# Patient Record
Sex: MALE | Race: WHITE | Employment: OTHER | URBAN - METROPOLITAN AREA
[De-identification: names, ages, dates, MRNs, and addresses within clinical notes are randomized per-mention and may not be internally consistent; named-entity substitution may affect disease eponyms.]

---

## 2022-04-07 ENCOUNTER — HOSPITAL ENCOUNTER (OUTPATIENT)
Dept: PREADMISSION TESTING | Age: 72
Discharge: HOME | End: 2022-04-07
Payer: MEDICARE

## 2022-04-07 VITALS
BODY MASS INDEX: 35.07 KG/M2 | RESPIRATION RATE: 20 BRPM | HEIGHT: 70 IN | WEIGHT: 245 LBS | SYSTOLIC BLOOD PRESSURE: 142 MMHG | TEMPERATURE: 97.9 F | HEART RATE: 71 BPM | DIASTOLIC BLOOD PRESSURE: 88 MMHG

## 2022-04-07 LAB
ANION GAP SERPL CALC-SCNC: 7 MMOL/L (ref 5–15)
APTT PPP: 37.3 SEC (ref 22.1–31)
BASOPHILS # BLD: 0.1 K/UL (ref 0–0.1)
BASOPHILS NFR BLD: 1 % (ref 0–1)
BUN SERPL-MCNC: 8 MG/DL (ref 6–20)
BUN/CREAT SERPL: 13 (ref 12–20)
CALCIUM SERPL-MCNC: 8.7 MG/DL (ref 8.5–10.1)
CHLORIDE SERPL-SCNC: 104 MMOL/L (ref 97–108)
CO2 SERPL-SCNC: 28 MMOL/L (ref 21–32)
CREAT SERPL-MCNC: 0.61 MG/DL (ref 0.7–1.3)
DIFFERENTIAL METHOD BLD: NORMAL
EOSINOPHIL # BLD: 0.1 K/UL (ref 0–0.4)
EOSINOPHIL NFR BLD: 1 % (ref 0–7)
ERYTHROCYTE [DISTWIDTH] IN BLOOD BY AUTOMATED COUNT: 13.6 % (ref 11.5–14.5)
EST. AVERAGE GLUCOSE BLD GHB EST-MCNC: 255 MG/DL
GLUCOSE SERPL-MCNC: 206 MG/DL (ref 65–100)
HBA1C MFR BLD: 10.5 % (ref 4–5.6)
HCT VFR BLD AUTO: 44 % (ref 36.6–50.3)
HGB BLD-MCNC: 14.4 G/DL (ref 12.1–17)
IMM GRANULOCYTES # BLD AUTO: 0 K/UL (ref 0–0.04)
IMM GRANULOCYTES NFR BLD AUTO: 0 % (ref 0–0.5)
INR PPP: 3.6 (ref 0.9–1.1)
LYMPHOCYTES # BLD: 2.4 K/UL (ref 0.8–3.5)
LYMPHOCYTES NFR BLD: 32 % (ref 12–49)
MCH RBC QN AUTO: 29.1 PG (ref 26–34)
MCHC RBC AUTO-ENTMCNC: 32.7 G/DL (ref 30–36.5)
MCV RBC AUTO: 89.1 FL (ref 80–99)
MONOCYTES # BLD: 0.7 K/UL (ref 0–1)
MONOCYTES NFR BLD: 9 % (ref 5–13)
NEUTS SEG # BLD: 4.2 K/UL (ref 1.8–8)
NEUTS SEG NFR BLD: 57 % (ref 32–75)
NRBC # BLD: 0 K/UL (ref 0–0.01)
NRBC BLD-RTO: 0 PER 100 WBC
PLATELET # BLD AUTO: 219 K/UL (ref 150–400)
PMV BLD AUTO: 10.8 FL (ref 8.9–12.9)
POTASSIUM SERPL-SCNC: 3.4 MMOL/L (ref 3.5–5.1)
PROTHROMBIN TIME: 34.6 SEC (ref 9–11.1)
RBC # BLD AUTO: 4.94 M/UL (ref 4.1–5.7)
SODIUM SERPL-SCNC: 139 MMOL/L (ref 136–145)
THERAPEUTIC RANGE,PTTT: ABNORMAL SECS (ref 58–77)
WBC # BLD AUTO: 7.4 K/UL (ref 4.1–11.1)

## 2022-04-07 PROCEDURE — 83036 HEMOGLOBIN GLYCOSYLATED A1C: CPT

## 2022-04-07 PROCEDURE — 36415 COLL VENOUS BLD VENIPUNCTURE: CPT

## 2022-04-07 PROCEDURE — 85730 THROMBOPLASTIN TIME PARTIAL: CPT

## 2022-04-07 PROCEDURE — 85610 PROTHROMBIN TIME: CPT

## 2022-04-07 PROCEDURE — 85025 COMPLETE CBC W/AUTO DIFF WBC: CPT

## 2022-04-07 PROCEDURE — 80048 BASIC METABOLIC PNL TOTAL CA: CPT

## 2022-04-07 RX ORDER — ESCITALOPRAM OXALATE 20 MG/1
20 TABLET ORAL
COMMUNITY

## 2022-04-07 RX ORDER — GABAPENTIN 300 MG/1
300 CAPSULE ORAL
COMMUNITY

## 2022-04-07 RX ORDER — INSULIN GLARGINE 300 U/ML
30 INJECTION, SOLUTION SUBCUTANEOUS DAILY
COMMUNITY

## 2022-04-07 RX ORDER — LISINOPRIL 40 MG/1
40 TABLET ORAL
COMMUNITY

## 2022-04-07 RX ORDER — METFORMIN HYDROCHLORIDE 1000 MG/1
1000 TABLET ORAL 2 TIMES DAILY
COMMUNITY

## 2022-04-07 RX ORDER — CARBIDOPA 25 MG/1
25 TABLET ORAL
COMMUNITY

## 2022-04-07 RX ORDER — WARFARIN 6 MG/1
6 TABLET ORAL DAILY
COMMUNITY

## 2022-04-07 NOTE — PERIOP NOTES
[address] INSTRUCTIONS    Surgery Date:   4-14-22    [facility] staff will call you between 4 PM- 8 PM the day before surgery with your arrival time. If your surgery is on a Monday, we will call you the preceding Friday. Please call [phone] after 8 PM if you did not receive your arrival time. 1. Please report to [facility] Patient Access/Admitting on the 1st floor. Bring your insurance card, photo identification, and any copayment ( if applicable). 2. If you are going home the same day of your surgery, you must have a responsible adult to drive you home. You need to have a responsible adult to stay with you the first 24 hours after surgery and you should not drive a car for 24 hours following your surgery. 3. Nothing to eat or drink after midnight the night before surgery. This includes no water, gum, mints, coffee, juice, etc.  Please note special instructions, if applicable, below for medications. 4. Do NOT drink alcohol or smoke 24 hours before surgery. STOP smoking for 14 days prior as it helps with breathing and healing after surgery. 5. If you are being admitted to the hospital, please leave personal belongings/luggage in your car until you have an assigned hospital room number. 6. Please wear comfortable clothes. Wear your glasses instead of contacts. We ask that all money, jewelry and valuables be left at home. Wear no make up, particularly mascara, the day of surgery. 7.  All body piercings, rings, and jewelry need to be removed and left at home. Please remove any nail polish or artificial nails from your fingernails. Please wear your hair loose or down. Please no pony-tails, buns, or any metal hair accessories. If you shower the morning of surgery, please do not apply any lotions or powders afterwards. You may wear deodorant, unless having breast surgery. Do not shave any body area within 24 hours of your surgery.   8. Please follow all instructions to avoid any potential surgical cancellation. 9. Should your physical condition change, (i.e. fever, cold, flu, etc.) please notify your surgeon as soon as possible. 10. It is important to be on time. If a situation occurs where you may be delayed, please call:  [phone] / [phone] on the day of surgery. 11. The Preadmission Testing staff can be reached at [phone]. 12. Special instructions: CALL SURGEON OR PCP FOR INSTRUCTIONS ON  COUMADIN      Current Outpatient Medications   Medication Sig    metFORMIN (GLUCOPHAGE) 1,000 mg tablet Take 1,000 mg by mouth two (2) times a day.  warfarin (Coumadin) 6 mg tablet Take 6 mg by mouth daily.  gabapentin (NEURONTIN) 300 mg capsule Take 300 mg by mouth nightly.  lisinopriL (PRINIVIL, ZESTRIL) 40 mg tablet Take 40 mg by mouth nightly.  carbidopa (Lodosyn) 25 mg tab tablet Take 25 mg by mouth nightly.  escitalopram oxalate (LEXAPRO) 20 mg tablet Take 20 mg by mouth nightly.  insulin glargine U-300 conc (Toujeo Max U-300 SoloStar) 300 unit/mL (3 mL) inpn 30 Units by SubCUTAneous route.  acetaminophen/diphenhydramine (TYLENOL PM PO) Take  by mouth nightly as needed.  atorvastatin (LIPITOR) 80 mg tablet Take 80 mg by mouth nightly.  atenolol (TENORMIN) 25 mg tablet Take 25 mg by mouth nightly. No current facility-administered medications for this encounter. 1. YOU MUST ONLY TAKE THESE MEDICATIONS THE MORNING OF SURGERY WITH A SIP OF WATER: NONE  2. MEDICATIONS TO TAKE THE MORNING OF SURGERY ONLY IF NEEDED: NONE  3. HOLD these prescription medications BEFORE Surgery: METFORMIN 24 HOURS PRIOR TO SURGERY UNLESS OTHERWISE INSTRUCTED BY MD  4. Ask your surgeon/prescribing physician about when/if to STOP taking these medications: COUMADIN  5. Stop all vitamins, herbal medicines and Aspirin containing products 7 days prior to surgery.  Stop any non-steroidal anti-inflammatory drugs (i.e. Ibuprofen, Naproxen, Advil, Aleve) 3 days before surgery. You may take Tylenol. 6. If you are currently taking Plavix, Coumadin, or any other blood-thinning/anticoagulant medication contact your prescribing physician for instructions. Preventing Infections Before and After  Your Surgery    IMPORTANT INSTRUCTIONS      You play an important role in your health and preparation for surgery. To reduce the germs on your skin you will need to shower with CHG soap (Chorhexidine gluconate 4%) two times before surgery. CHG soap (Hibiclens, Hex-A-Clens or store brand)   CHG soap will be provided at your Preadmission Testing (PAT) appointment.  If you do not have a PAT appointment before surgery, you may arrange to  CHG soap from our office or purchase CHG soap at a pharmacy, grocery or department store.  You need to purchase TWO 4 ounce bottles to use for your 2 showers. Steps to follow:  1. Wash your hair with your normal shampoo and your body with regular soap and rinse well to remove shampoo and soap from your skin. 2. Wet a clean washcloth and turn off the shower. 3. Put CHG soap on washcloth and apply to your entire body from the neck down. Do not use on your head, face or private parts(genitals). Do not use CHG soap on open sores, wounds or areas of skin irritation. 4. Wash you body gently for 5 minutes. Do not wash your skin too hard. This soap does not create lather. Pay special attention to your underarms and from your belly button to your feet. 5. Turn the shower back on and rinse well to get CHG soap off your body. 6. Pat your skin dry with a clean, dry towel. Do not apply lotions or moisturizer. 7. Put on clean clothes and sleep on fresh bed sheets and do not allow pets to sleep with you. Shower with CHG soap 2 times before your surgery   The evening before your surgery   The morning of your surgery      Tips to help prevent infections after your surgery:  1.  Protect your surgical wound from germs:  ? Hand washing is the most important thing you and your caregivers can do to prevent infections. ? Keep your bandage clean and dry! ? Do not touch your surgical wound. 2. Use clean, freshly washed towels and washcloths every time you shower; do not share bath linens with others. 3. Until your surgical wound is healed, wear clothing and sleep on bed linens each day that are clean and freshly washed. 4. Do not allow pets to sleep in your bed with you or touch your surgical wound. 5. Do not smoke  smoking delays wound healing. This may be a good time to stop smoking. 6. If you have diabetes, it is important for you to manage your blood sugar levels properly before your surgery as well as after your surgery. Poorly managed blood sugar levels slow down wound healing and prevent you from healing completely. Patient Information Regarding COVID Restrictions    Day of Procedure     Please park in the parking deck or any designated visitor parking lot.  Enter the facility through the [facility] the [facility].   On the day of surgery, please provide the cell phone number of the person who will be waiting for you to the Patient Access representative at the time of registration.  Please wear a mask on the day of your procedure.  We are now allowing two designated visitors per stay. Pediatric patients may have 2 designated visitors. These two people may come in with you on the day of your procedure.  No visitors under the age of 13.  The designated visitor must also wear a mask.  Once your procedure and the immediate recovery period is completed, a nurse in the recovery area will contact your designated visitor to inform them of your room number or to otherwise review other pertinent information regarding your care.  Social distancing practices are to be adhered to in waiting areas and the cafeteria. The patient was contacted  in person.    He verbalized understanding of all instructions does not  need reinforcement.

## 2022-04-07 NOTE — PERIOP NOTES
Prevention of Infection  Testing for Staphylococcus aureus on your skin before surgery    Staphylococcus aureus (staph) is a common bacteria that is found on the body. It normally does not cause infection on healthy skin. Before surgery, you will be tested to see if you have staph by swabbing the inside of your nose. When you have an incision with surgery, the goal is to protect that incision from infection. Removal of the staph bacteria before surgery can decrease the risk of a surgical site infection. If your nose swab is positive for staph you will be called. Your treatment will include 2 steps:   Prescription for Mupirocin ointment to be used in each nostril twice a day for 5 days.  Showering with Chlorhexidine (CHG) liquid soap for 5 days prior to surgery. How to use Mupirocin ointment in your nose  1.  the prescription from your pharmacy. You will receive a large tube of ointment which will be big enough for all of your treatments. You will apply this ointment to each nostril 2 times a day for 5 days. 2. Wash your hands with  gel or soap and water for 20 seconds before using ointment. 3. Place a pea-sized amount of ointment on a cotton Q-tip. 4. Apply ointment just inside of each nostril with the Q-tip. Do not push Q-tip or ointment deep inside you nose. 5. Press your nostrils together and massage for a few seconds. 6. Wash your hands with  gel or soap and water after you are finished. 7. Do not get ointment near your eyes. If it gets into your eyes, rinse them with cool water. 8. If you need to use nasal spray, clean the tip of the bottle with alcohol before use and do not use both at the same time. 9. If you are scheduled for COVID testing during the 5 days, do NOT apply morning dose until after the COVID test has been performed. How to use Chlorhexidine (CHG) 4% liquid soap  1.  Purchase an 8 ounce bottle of CHG liquid soap (Chlorhexidine 4%, Hibiclens, Lora-Jens or store brand) at a pharmacy or grocery store. 2. Wash your hair with your normal shampoo and your body with regular soap and rinse well to remove shampoo and soap from your skin. 3. Wet a clean washcloth and turn off the shower. 4. Put CHG soap on washcloth and apply to your entire body from the neck down. Do not use on your head, face or private parts(genitals). Do not use CHG soap on open sores, wounds or areas of skin irritation. 5. Wash your body gently for 5 minutes. Do not wash your skin too hard. This soap does not create lather. Pay special attention to your underarms and from your belly button to your feet. 6. Turn the shower back on and rinse well to get CHG soap off your body. 7. Pat your skin dry with a clean, dry towel. Do not apply lotions or moisturizer. 8. Put on clean clothes and sleep on fresh bed sheets the night before surgery. Do not allow pets to sleep with you.

## 2022-04-08 LAB
BACTERIA SPEC CULT: NORMAL
BACTERIA SPEC CULT: NORMAL
SERVICE CMNT-IMP: NORMAL

## 2022-04-08 NOTE — PERIOP NOTES
[facility] [address] office re:  Glucose 206, HgbA1c 10.5, PT 34.6, INR 3.6, PTT 37.3. Chart referred to CITLALLI Cancino NP for f/u.

## 2022-04-14 ENCOUNTER — HOSPITAL ENCOUNTER (OUTPATIENT)
Age: 72
Setting detail: OUTPATIENT SURGERY
Discharge: HOME | End: 2022-04-14
Attending: OTOLARYNGOLOGY | Admitting: OTOLARYNGOLOGY
Payer: MEDICARE

## 2022-04-14 ENCOUNTER — ANESTHESIA (OUTPATIENT)
Dept: SURGERY | Age: 72
End: 2022-04-14
Payer: MEDICARE

## 2022-04-14 ENCOUNTER — ANESTHESIA EVENT (OUTPATIENT)
Dept: SURGERY | Age: 72
End: 2022-04-14
Payer: MEDICARE

## 2022-04-14 VITALS
RESPIRATION RATE: 16 BRPM | WEIGHT: 244.93 LBS | BODY MASS INDEX: 35.07 KG/M2 | DIASTOLIC BLOOD PRESSURE: 79 MMHG | SYSTOLIC BLOOD PRESSURE: 128 MMHG | HEIGHT: 70 IN | HEART RATE: 54 BPM | OXYGEN SATURATION: 100 % | TEMPERATURE: 98.6 F

## 2022-04-14 LAB
GLUCOSE BLD STRIP.AUTO-MCNC: 185 MG/DL (ref 65–117)
GLUCOSE BLD STRIP.AUTO-MCNC: 208 MG/DL (ref 65–117)
INR BLD: 1.1 (ref 0.9–1.2)
SERVICE CMNT-IMP: ABNORMAL
SERVICE CMNT-IMP: ABNORMAL

## 2022-04-14 PROCEDURE — 76210000020 HC REC RM PH II FIRST 0.5 HR: Performed by: OTOLARYNGOLOGY

## 2022-04-14 PROCEDURE — 82962 GLUCOSE BLOOD TEST: CPT

## 2022-04-14 PROCEDURE — 77030008684 HC TU ET CUF COVD -B: Performed by: ANESTHESIOLOGY

## 2022-04-14 PROCEDURE — 76210000006 HC OR PH I REC 0.5 TO 1 HR: Performed by: OTOLARYNGOLOGY

## 2022-04-14 PROCEDURE — 74011250636 HC RX REV CODE- 250/636: Performed by: ANESTHESIOLOGY

## 2022-04-14 PROCEDURE — 76060000033 HC ANESTHESIA 1 TO 1.5 HR: Performed by: OTOLARYNGOLOGY

## 2022-04-14 PROCEDURE — 74011000250 HC RX REV CODE- 250: Performed by: OTOLARYNGOLOGY

## 2022-04-14 PROCEDURE — 85610 PROTHROMBIN TIME: CPT

## 2022-04-14 PROCEDURE — 76010000138 HC OR TIME 0.5 TO 1 HR: Performed by: OTOLARYNGOLOGY

## 2022-04-14 PROCEDURE — 77030026438 HC STYL ET INTUB CARD -A: Performed by: ANESTHESIOLOGY

## 2022-04-14 PROCEDURE — 88304 TISSUE EXAM BY PATHOLOGIST: CPT

## 2022-04-14 PROCEDURE — 74011250637 HC RX REV CODE- 250/637: Performed by: ANESTHESIOLOGY

## 2022-04-14 PROCEDURE — 74011000250 HC RX REV CODE- 250: Performed by: NURSE ANESTHETIST, CERTIFIED REGISTERED

## 2022-04-14 PROCEDURE — 74011250636 HC RX REV CODE- 250/636: Performed by: NURSE ANESTHETIST, CERTIFIED REGISTERED

## 2022-04-14 PROCEDURE — 2709999900 HC NON-CHARGEABLE SUPPLY: Performed by: OTOLARYNGOLOGY

## 2022-04-14 PROCEDURE — 77030040361 HC SLV COMPR DVT MDII -B: Performed by: OTOLARYNGOLOGY

## 2022-04-14 RX ORDER — MORPHINE SULFATE 2 MG/ML
2 INJECTION, SOLUTION INTRAMUSCULAR; INTRAVENOUS
Status: DISCONTINUED | OUTPATIENT
Start: 2022-04-14 | End: 2022-04-14 | Stop reason: HOSPADM

## 2022-04-14 RX ORDER — ONDANSETRON 2 MG/ML
4 INJECTION INTRAMUSCULAR; INTRAVENOUS AS NEEDED
Status: DISCONTINUED | OUTPATIENT
Start: 2022-04-14 | End: 2022-04-14 | Stop reason: HOSPADM

## 2022-04-14 RX ORDER — CEFAZOLIN SODIUM 1 G/3ML
INJECTION, POWDER, FOR SOLUTION INTRAMUSCULAR; INTRAVENOUS AS NEEDED
Status: DISCONTINUED | OUTPATIENT
Start: 2022-04-14 | End: 2022-04-14 | Stop reason: HOSPADM

## 2022-04-14 RX ORDER — ONDANSETRON 2 MG/ML
4 INJECTION INTRAMUSCULAR; INTRAVENOUS
Status: CANCELLED | OUTPATIENT
Start: 2022-04-14

## 2022-04-14 RX ORDER — FENTANYL CITRATE 50 UG/ML
INJECTION, SOLUTION INTRAMUSCULAR; INTRAVENOUS AS NEEDED
Status: DISCONTINUED | OUTPATIENT
Start: 2022-04-14 | End: 2022-04-14 | Stop reason: HOSPADM

## 2022-04-14 RX ORDER — OXYCODONE AND ACETAMINOPHEN 5; 325 MG/1; MG/1
1 TABLET ORAL AS NEEDED
Status: DISCONTINUED | OUTPATIENT
Start: 2022-04-14 | End: 2022-04-14 | Stop reason: HOSPADM

## 2022-04-14 RX ORDER — SODIUM CHLORIDE, SODIUM LACTATE, POTASSIUM CHLORIDE, CALCIUM CHLORIDE 600; 310; 30; 20 MG/100ML; MG/100ML; MG/100ML; MG/100ML
125 INJECTION, SOLUTION INTRAVENOUS CONTINUOUS
Status: DISCONTINUED | OUTPATIENT
Start: 2022-04-14 | End: 2022-04-14 | Stop reason: HOSPADM

## 2022-04-14 RX ORDER — FENTANYL CITRATE 50 UG/ML
25 INJECTION, SOLUTION INTRAMUSCULAR; INTRAVENOUS
Status: DISCONTINUED | OUTPATIENT
Start: 2022-04-14 | End: 2022-04-14 | Stop reason: HOSPADM

## 2022-04-14 RX ORDER — SODIUM CHLORIDE 0.9 % (FLUSH) 0.9 %
5-40 SYRINGE (ML) INJECTION AS NEEDED
Status: DISCONTINUED | OUTPATIENT
Start: 2022-04-14 | End: 2022-04-14 | Stop reason: HOSPADM

## 2022-04-14 RX ORDER — SODIUM CHLORIDE 0.9 % (FLUSH) 0.9 %
5-40 SYRINGE (ML) INJECTION AS NEEDED
Status: CANCELLED | OUTPATIENT
Start: 2022-04-14

## 2022-04-14 RX ORDER — BACITRACIN ZINC 500 UNIT/G
OINTMENT (GRAM) TOPICAL AS NEEDED
Status: DISCONTINUED | OUTPATIENT
Start: 2022-04-14 | End: 2022-04-14 | Stop reason: HOSPADM

## 2022-04-14 RX ORDER — HYDROCODONE BITARTRATE AND ACETAMINOPHEN 5; 325 MG/1; MG/1
1 TABLET ORAL
Status: CANCELLED | OUTPATIENT
Start: 2022-04-14

## 2022-04-14 RX ORDER — ROCURONIUM BROMIDE 10 MG/ML
INJECTION, SOLUTION INTRAVENOUS AS NEEDED
Status: DISCONTINUED | OUTPATIENT
Start: 2022-04-14 | End: 2022-04-14 | Stop reason: HOSPADM

## 2022-04-14 RX ORDER — LIDOCAINE HYDROCHLORIDE 10 MG/ML
0.1 INJECTION, SOLUTION EPIDURAL; INFILTRATION; INTRACAUDAL; PERINEURAL AS NEEDED
Status: DISCONTINUED | OUTPATIENT
Start: 2022-04-14 | End: 2022-04-14 | Stop reason: HOSPADM

## 2022-04-14 RX ORDER — ACETAMINOPHEN 325 MG/1
650 TABLET ORAL ONCE
Status: COMPLETED | OUTPATIENT
Start: 2022-04-14 | End: 2022-04-14

## 2022-04-14 RX ORDER — MIDAZOLAM HYDROCHLORIDE 1 MG/ML
INJECTION, SOLUTION INTRAMUSCULAR; INTRAVENOUS AS NEEDED
Status: DISCONTINUED | OUTPATIENT
Start: 2022-04-14 | End: 2022-04-14 | Stop reason: HOSPADM

## 2022-04-14 RX ORDER — LIDOCAINE HYDROCHLORIDE 20 MG/ML
INJECTION, SOLUTION EPIDURAL; INFILTRATION; INTRACAUDAL; PERINEURAL AS NEEDED
Status: DISCONTINUED | OUTPATIENT
Start: 2022-04-14 | End: 2022-04-14 | Stop reason: HOSPADM

## 2022-04-14 RX ORDER — SUCCINYLCHOLINE CHLORIDE 20 MG/ML
INJECTION INTRAMUSCULAR; INTRAVENOUS AS NEEDED
Status: DISCONTINUED | OUTPATIENT
Start: 2022-04-14 | End: 2022-04-14 | Stop reason: HOSPADM

## 2022-04-14 RX ORDER — DEXMEDETOMIDINE HYDROCHLORIDE 100 UG/ML
INJECTION, SOLUTION INTRAVENOUS AS NEEDED
Status: DISCONTINUED | OUTPATIENT
Start: 2022-04-14 | End: 2022-04-14 | Stop reason: HOSPADM

## 2022-04-14 RX ORDER — HYDROMORPHONE HYDROCHLORIDE 1 MG/ML
0.2 INJECTION, SOLUTION INTRAMUSCULAR; INTRAVENOUS; SUBCUTANEOUS
Status: DISCONTINUED | OUTPATIENT
Start: 2022-04-14 | End: 2022-04-14 | Stop reason: HOSPADM

## 2022-04-14 RX ORDER — FENTANYL CITRATE 50 UG/ML
50 INJECTION, SOLUTION INTRAMUSCULAR; INTRAVENOUS AS NEEDED
Status: DISCONTINUED | OUTPATIENT
Start: 2022-04-14 | End: 2022-04-14 | Stop reason: HOSPADM

## 2022-04-14 RX ORDER — ONDANSETRON 2 MG/ML
INJECTION INTRAMUSCULAR; INTRAVENOUS AS NEEDED
Status: DISCONTINUED | OUTPATIENT
Start: 2022-04-14 | End: 2022-04-14 | Stop reason: HOSPADM

## 2022-04-14 RX ORDER — PROPOFOL 10 MG/ML
INJECTION, EMULSION INTRAVENOUS AS NEEDED
Status: DISCONTINUED | OUTPATIENT
Start: 2022-04-14 | End: 2022-04-14 | Stop reason: HOSPADM

## 2022-04-14 RX ORDER — SODIUM CHLORIDE 0.9 % (FLUSH) 0.9 %
5-40 SYRINGE (ML) INJECTION EVERY 8 HOURS
Status: DISCONTINUED | OUTPATIENT
Start: 2022-04-14 | End: 2022-04-14 | Stop reason: HOSPADM

## 2022-04-14 RX ORDER — MIDAZOLAM HYDROCHLORIDE 1 MG/ML
1 INJECTION, SOLUTION INTRAMUSCULAR; INTRAVENOUS AS NEEDED
Status: DISCONTINUED | OUTPATIENT
Start: 2022-04-14 | End: 2022-04-14 | Stop reason: HOSPADM

## 2022-04-14 RX ORDER — MIDAZOLAM HYDROCHLORIDE 1 MG/ML
0.5 INJECTION, SOLUTION INTRAMUSCULAR; INTRAVENOUS
Status: DISCONTINUED | OUTPATIENT
Start: 2022-04-14 | End: 2022-04-14 | Stop reason: HOSPADM

## 2022-04-14 RX ORDER — SODIUM CHLORIDE 0.9 % (FLUSH) 0.9 %
5-40 SYRINGE (ML) INJECTION EVERY 8 HOURS
Status: CANCELLED | OUTPATIENT
Start: 2022-04-14

## 2022-04-14 RX ORDER — DIPHENHYDRAMINE HYDROCHLORIDE 50 MG/ML
12.5 INJECTION, SOLUTION INTRAMUSCULAR; INTRAVENOUS AS NEEDED
Status: DISCONTINUED | OUTPATIENT
Start: 2022-04-14 | End: 2022-04-14 | Stop reason: HOSPADM

## 2022-04-14 RX ORDER — PHENYLEPHRINE HCL IN 0.9% NACL 0.4MG/10ML
SYRINGE (ML) INTRAVENOUS AS NEEDED
Status: DISCONTINUED | OUTPATIENT
Start: 2022-04-14 | End: 2022-04-14 | Stop reason: HOSPADM

## 2022-04-14 RX ORDER — LIDOCAINE HYDROCHLORIDE AND EPINEPHRINE 10; 10 MG/ML; UG/ML
INJECTION, SOLUTION INFILTRATION; PERINEURAL AS NEEDED
Status: DISCONTINUED | OUTPATIENT
Start: 2022-04-14 | End: 2022-04-14 | Stop reason: HOSPADM

## 2022-04-14 RX ORDER — MORPHINE SULFATE 2 MG/ML
2 INJECTION, SOLUTION INTRAMUSCULAR; INTRAVENOUS
Status: CANCELLED | OUTPATIENT
Start: 2022-04-14

## 2022-04-14 RX ORDER — SODIUM CHLORIDE 9 MG/ML
25 INJECTION, SOLUTION INTRAVENOUS CONTINUOUS
Status: DISCONTINUED | OUTPATIENT
Start: 2022-04-14 | End: 2022-04-14 | Stop reason: HOSPADM

## 2022-04-14 RX ORDER — DEXAMETHASONE SODIUM PHOSPHATE 4 MG/ML
INJECTION, SOLUTION INTRA-ARTICULAR; INTRALESIONAL; INTRAMUSCULAR; INTRAVENOUS; SOFT TISSUE AS NEEDED
Status: DISCONTINUED | OUTPATIENT
Start: 2022-04-14 | End: 2022-04-14 | Stop reason: HOSPADM

## 2022-04-14 RX ADMIN — ACETAMINOPHEN 650 MG: 325 TABLET ORAL at 14:02

## 2022-04-14 RX ADMIN — PROPOFOL 50 MG: 10 INJECTION, EMULSION INTRAVENOUS at 15:37

## 2022-04-14 RX ADMIN — SUCCINYLCHOLINE CHLORIDE 140 MG: 20 INJECTION, SOLUTION INTRAMUSCULAR; INTRAVENOUS at 15:37

## 2022-04-14 RX ADMIN — ROCURONIUM BROMIDE 10 MG: 10 SOLUTION INTRAVENOUS at 15:36

## 2022-04-14 RX ADMIN — Medication 80 MCG: at 16:10

## 2022-04-14 RX ADMIN — FENTANYL CITRATE 50 MCG: 50 INJECTION, SOLUTION INTRAMUSCULAR; INTRAVENOUS at 15:41

## 2022-04-14 RX ADMIN — LIDOCAINE HYDROCHLORIDE 80 MG: 20 INJECTION, SOLUTION EPIDURAL; INFILTRATION; INTRACAUDAL; PERINEURAL at 15:36

## 2022-04-14 RX ADMIN — ONDANSETRON HYDROCHLORIDE 4 MG: 2 INJECTION, SOLUTION INTRAMUSCULAR; INTRAVENOUS at 15:42

## 2022-04-14 RX ADMIN — PROPOFOL 100 MG: 10 INJECTION, EMULSION INTRAVENOUS at 15:36

## 2022-04-14 RX ADMIN — DEXMEDETOMIDINE HYDROCHLORIDE 10 MCG: 100 INJECTION, SOLUTION, CONCENTRATE INTRAVENOUS at 15:32

## 2022-04-14 RX ADMIN — CEFAZOLIN 2 G: 330 INJECTION, POWDER, FOR SOLUTION INTRAMUSCULAR; INTRAVENOUS at 15:45

## 2022-04-14 RX ADMIN — SODIUM CHLORIDE, POTASSIUM CHLORIDE, SODIUM LACTATE AND CALCIUM CHLORIDE 125 ML/HR: 600; 310; 30; 20 INJECTION, SOLUTION INTRAVENOUS at 14:17

## 2022-04-14 RX ADMIN — MIDAZOLAM 2 MG: 1 INJECTION INTRAMUSCULAR; INTRAVENOUS at 15:28

## 2022-04-14 RX ADMIN — FENTANYL CITRATE 50 MCG: 50 INJECTION, SOLUTION INTRAMUSCULAR; INTRAVENOUS at 15:36

## 2022-04-14 RX ADMIN — DEXAMETHASONE SODIUM PHOSPHATE 4 MG: 4 INJECTION, SOLUTION INTRAMUSCULAR; INTRAVENOUS at 15:42

## 2022-04-14 NOTE — ANESTHESIA POSTPROCEDURE EVALUATION
Procedure(s):  EXCISION SOFT TISSUE NEOPLASM WITH ADJACENT TISSUE TRANSFER OF THE LIP. general    Anesthesia Post Evaluation        Patient participation: complete - patient participated  Level of consciousness: awake  Pain management: adequate  Airway patency: patent  Anesthetic complications: no  Cardiovascular status: hemodynamically stable  Respiratory status: acceptable  Hydration status: acceptable  Comments: The patient is ready for PACU discharge. Kareen Spann DO                   Post anesthesia nausea and vomiting:  controlled      INITIAL Post-op Vital signs:   Vitals Value Taken Time   /96 04/14/22 1642   Temp 36.9 °C (98.5 °F) 04/14/22 1633   Pulse 80 04/14/22 1645   Resp 17 04/14/22 1645   SpO2 96 % 04/14/22 1645   Vitals shown include unvalidated device data.

## 2022-04-14 NOTE — ANESTHESIA PREPROCEDURE EVALUATION
Relevant Problems   CARDIOVASCULAR   (+) A-fib (HCC)       Anesthetic History   No history of anesthetic complications            Review of Systems / Medical History  Patient summary reviewed, nursing notes reviewed and pertinent labs reviewed    Pulmonary    COPD    Sleep apnea           Neuro/Psych       CVA      Comments: parkinsons Cardiovascular    Hypertension      CHF  Dysrhythmias : atrial fibrillation  CAD and cardiac stents         GI/Hepatic/Renal  Within defined limits              Endo/Other    Diabetes         Other Findings            Physical Exam    Airway  Mallampati: III  TM Distance: > 6 cm  Neck ROM: normal range of motion   Mouth opening: Normal     Cardiovascular  Regular rate and rhythm,  S1 and S2 normal,  no murmur, click, rub, or gallop             Dental    Dentition: Full upper dentures and Poor dentition     Pulmonary  Breath sounds clear to auscultation               Abdominal  GI exam deferred       Other Findings            Anesthetic Plan    ASA: 3  Anesthesia type: general          Induction: Intravenous  Anesthetic plan and risks discussed with: Patient

## 2022-04-14 NOTE — PROGRESS NOTES
Discharge instructions and medications reviewed with patient and wife and daughter. Opportunity was given for patient and responsible party to ask questions. No further questions at this time. Responsible party and patient verbalizes understanding of discharge instructions. A Copy of discharge instructions given to patient. Patient discharged with all belongings.

## 2022-04-14 NOTE — DISCHARGE INSTRUCTIONS
Patient Education     Wound Care: After Your Visit  Your Care Instructions  Taking good care of your wound at home will help it heal quickly and reduce your chance of infection. The doctor has checked you carefully, but problems can develop later. If you notice any problems or new symptoms, get medical treatment right away. Follow-up care is a key part of your treatment and safety. Be sure to make and go to all appointments, and call your doctor if you are having problems. It's also a good idea to know your test results and keep a list of the medicines you take. How can you care for yourself at home? · Clean the area with soap and water 2 times a day unless your doctor gives you different instructions. Don't use hydrogen peroxide or alcohol, which can slow healing. ¨ You may cover the wound with a thin layer of antibiotic ointment, such as bacitracin, and a nonstick bandage. ¨ Apply more ointment and replace the bandage as needed. · Take pain medicines exactly as directed. Some pain is normal with a wound, but do not ignore pain that is getting worse instead of better. You could have an infection. ¨ If the doctor gave you a prescription medicine for pain, take it as prescribed. ¨ If you are not taking a prescription pain medicine, ask your doctor if you can take an over-the-counter medicine. · Your doctor may have closed your wound with stitches (sutures), staples, or skin glue. ¨ If you have stitches, your doctor may remove them after several days to 2 weeks. Or you may have stitches that dissolve on their own. ¨ If you have staples, your doctor may remove them after 7 to 10 days. ¨ If your wound was closed with skin glue, the glue will wear off in a few days to 2 weeks. When should you call for help? Call your doctor now or seek immediate medical care if:  · You have signs of infection, such as:  ¨ Increased pain, swelling, warmth, or redness near the wound.   ¨ Red streaks leading from the wound.  ¨ Pus draining from the wound. ¨ A fever. · You bleed so much from your incision that you soak one or more bandages over 2 to 4 hours. Watch closely for changes in your health, and be sure to contact your doctor if:  · The wound is not getting better each day. Where can you learn more? Go to [employer]  Enter M973 in the search box to learn more about \"Wound Care: After Your Visit. \"   © 0553-8318 [employer], [employer]. Care instructions adapted under license by [facility] (which disclaims liability or warranty for this information). This care instruction is for use with your licensed healthcare professional. If you have questions about a medical condition or this instruction, always ask your healthcare professional. [address] any warranty or liability for your use of this information. Content Version: 11.9.607216; Last Revised: April 23, 2012               ______________________________________________________________________    Anesthesia Discharge Instructions    After general anesthesia or intervenous sedation, for 24 hours or while taking prescription Narcotics:  · Limit your activities  · Do not drive or operate hazardous machinery  · If you have not urinated within 8 hours after discharge, please contact your surgeon on call. · Do not make important personal or business decisions  · Do not drink alcoholic beverages    Report the following to your surgeon:  · Excessive pain, swelling, redness or odor of or around the surgical area  · Temperature over 100.5 degrees  · Nausea and vomiting lasting longer than 4 hours or if unable to take medication  · Any signs of decreased circulation or nerve impairment to extremity:  Change in color, persistent numbness, tingling, coldness or increased pain.   · Any questions

## 2022-04-14 NOTE — H&P
History and Physical    Subjective:     Shelly Dalal is a 73 y.o.  male who presents with right lip tumor undergoing progressive growth. Past Medical History:   Diagnosis Date    Arrhythmia     AFIB    Arthritis     Asthma     CAD (coronary artery disease) 2009    MI    COPD     Deep vein thrombosis (DVT) (HCC)     Diabetes ([facility] [address]     TYPE 2    Heart failure (HCC)     Hypertension     Ill-defined condition     PARKINSONS DISEASE    Irregular heartbeat     MRSA (methicillin resistant staph aureus) culture positive     Pulmonary emboli (HCC)     Sleep apnea     NO CPAP    Stroke ([facility] [address] 2009    LEFT SIDED WEAKNESS      Past Surgical History:   Procedure Laterality Date    HX HEENT      WISDOM TEETH REMOVED    HX TONSILLECTOMY      MD CARDIAC SURG PROCEDURE UNLIST  2009    cardiac cath with stents    VASCULAR SURGERY PROCEDURE UNLIST Right 2012    STENT PLACED RIGHT UPPER LEG FOR BLOOD CLOTS     Family History   Problem Relation Age of Onset    Heart Attack Mother     Parkinson's Disease Father     Stroke Father     OSTEOARTHRITIS Brother     Anesth Problems Neg Hx       Social History     Tobacco Use    Smoking status: Former Smoker     Packs/day: 3.00     Years: 30.00     Pack years: 90.00     Quit date: 2009     Years since quittin.0    Smokeless tobacco: Current User   Substance Use Topics    Alcohol use: Yes     Alcohol/week: 12.0 standard drinks     Types: 12 Glasses of wine per week       Prior to Admission medications    Medication Sig Start Date End Date Taking? Authorizing Provider   gabapentin (NEURONTIN) 300 mg capsule Take 300 mg by mouth nightly. Yes Provider, Historical   lisinopriL (PRINIVIL, ZESTRIL) 40 mg tablet Take 40 mg by mouth nightly. Yes Provider, Historical   carbidopa (Lodosyn) 25 mg tab tablet Take 25 mg by mouth nightly. Yes Provider, Historical   escitalopram oxalate (LEXAPRO) 20 mg tablet Take 20 mg by mouth nightly.    Yes Provider, Historical   atorvastatin (LIPITOR) 80 mg tablet Take 80 mg by mouth nightly. Yes Other, MD Ellen   atenolol (TENORMIN) 25 mg tablet Take 25 mg by mouth nightly. Yes Provider, Historical   metFORMIN (GLUCOPHAGE) 1,000 mg tablet Take 1,000 mg by mouth two (2) times a day. Provider, Historical   warfarin (Coumadin) 6 mg tablet Take 6 mg by mouth daily. Provider, Historical   insulin glargine U-300 conc (Toujeo Max U-300 SoloStar) 300 unit/mL (3 mL) inpn 30 Units by SubCUTAneous route daily. Provider, Historical   acetaminophen/diphenhydramine (TYLENOL PM PO) Take  by mouth nightly as needed. Provider, Historical     No Known Allergies     Review of Systems:  A comprehensive review of systems was negative except for that written in the History of Present Illness. Objective: Intake and Output:    No intake/output data recorded. No intake/output data recorded. Physical Exam:   Visit Vitals  BP (!) 135/93 (BP 1 Location: Right arm, BP Patient Position: At rest)   Pulse 84   Temp 97.8 °F (36.6 °C)   Resp 16   Ht 5' 10\" (1.778 m)   Wt 111.1 kg (244 lb 14.9 oz)   SpO2 96%   BMI 35.14 kg/m²     General:  Alert, cooperative, no distress, appears stated age. Head:  Normocephalic, without obvious abnormality, atraumatic. 2.5cm right upper lip tumor     Eyes:  Conjunctivae/corneas clear. PERRL, EOMs intact. Fundi benign   Ears:  Normal TMs and external ear canals both ears. Nose: Nares normal. Septum midline. Mucosa normal. No drainage or sinus tenderness. Throat: Lips, mucosa, and tongue normal. Teeth and gums normal.   Neck: Supple, symmetrical, trachea midline, no adenopathy, thyroid: no enlargement/tenderness/nodules, no carotid bruit and no JVD. Back:   Symmetric, no curvature. ROM normal. No CVA tenderness. Lungs:   Clear to auscultation bilaterally. Chest wall:  No tenderness or deformity. Heart:  Regular rate and rhythm, S1, S2 normal, no murmur, click, rub or gallop. Extremities: Extremities normal, atraumatic, no cyanosis or edema. Pulses: 2+ and symmetric all extremities. Skin: Skin color, texture, turgor normal. No rashes or lesions   Lymph nodes: Cervical, supraclavicular, and axillary nodes normal.   Neurologic: CNII-XII intact. Normal strength, sensation and reflexes throughout. Data Review:   Recent Results (from the past 24 hour(s))   POC INR    Collection Time: 04/14/22  2:08 PM   Result Value Ref Range    INR (POC) 1.1 <1.2     GLUCOSE, POC    Collection Time: 04/14/22  2:12 PM   Result Value Ref Range    Glucose (POC) 208 (H) 65 - 117 mg/dL    Performed by Asha Jules          Assessment:     Right upper lip tumor, clinically sebaceous cyst    Plan:     Excision of lip tumor  Local flap closure of right upper lip wound defect.      Signed By: Monty Espino MD     April 14, 2022

## 2022-04-14 NOTE — PERIOP NOTES
Patient: Roxanne Wilhelm MRN: [id number]  SSN: [id number]   YOB: 1950  Age: 73 y.o. Sex: male     Patient is status post Procedure(s):  EXCISION SOFT TISSUE NEOPLASM WITH ADJACENT TISSUE TRANSFER OF THE LIP. Surgeon(s) and Role:     Sherrell Simpson MD - Primary    Local/Dose/Irrigation:  7 ml 1% Lidocaine with epi.                    Peripheral IV 04/14/22 Right Hand (Active)                       Dressing/Packing:  Incision 04/14/22 Face-Dressing/Treatment: Cotton;Non-adherent;Tape/Soft cloth adhesive tape (04/14/22 1500)

## 2022-05-05 NOTE — OP NOTES
[address] [facility]  OPERATIVE REPORT    Name:  Paul Ding  MR#:  [id number]  :  1950  ACCOUNT #:  [de-identified]  DATE OF SERVICE:  2022    PREOPERATIVE DIAGNOSIS:  Right facial tumor. POSTOPERATIVE DIAGNOSIS:  Right facial dermal cyst.    PROCEDURES PERFORMED:  1. Right subfascial excision of facial tumor/dermal cyst:  3.5 cm.  2.  Nerve integrity monitoring x30 minutes. 3.  Subfascial, complex closure of face:  5 cm. SURGEON:  Nadine Parham MD    ASSISTANT:  Surgical assistant. ANESTHESIA:  General endotracheal tube anesthesia. COMPLICATIONS:  No complications. SPECIMENS REMOVED:  Right facial tumor. IMPLANTS:  No implant. ESTIMATED BLOOD LOSS:  10ml. INDICATIONS AND FINDINGS:  The patient had steady growth of the right facial tumor clinically suspicious for dermoid cyst.  Clinically, tumor extended into the deep facial soft tissues with concern for subfascial extension and hence indications for nerve monitoring. PROCEDURE:  In the operating room, the patient was induced under general endotracheal tube anesthesia following which he was prepped and draped in a sterile fashion. 1% lidocaine with 1:100,000 part epinephrine was used for local infiltration and infraorbital foramen block. An incision was marked out immediately around the facial tumor, incising the skin and dissecting through the facial SMAS plane to identify the capsule of the tumor. With 3-point tension and retraction, the capsule of the tumor was identified and an extracapsular dissection performed broaching into the facial musculature and the plane of the facial nerve. This dissection continued removing the tumor in its entirety and handing this off for pathologic analysis. The wound was irrigated.   A rotation advancement flap, an O to S type flap was marked out and planned creating a curvilinear fusiform Burow's triangles superiorly and inferomedially excising the skin and SMAS and elevating in subfascial SMAS plane anteriorly and posteriorly. The O to S rotation advancement was inset using 4-0 Vicryl in the SMAS plane and using a combination of interrupted and running locked 4-0 plain gut sutures in the skin, everting edges. Wounds were cleaned and antibiotic ointment applied and a compression dressing applied and the patient handed over to the Anesthesia for awakening and transfer to the recovery room.       Jt Spear MD      [id number]  D:  05/04/2022 13:58  T:  05/05/2022 0:19  JOB #:  [id number]  CC:  [facility]

## (undated) DEVICE — SYR 10ML CTRL LR LCK NSAF LF --

## (undated) DEVICE — GOWN,AURORA,NON-REINFORCED,2XL: Brand: MEDLINE

## (undated) DEVICE — SOLUTION SCRB 2OZ 10% POVIDONE IOD ANTIMIC BTL

## (undated) DEVICE — GARMENT,MEDLINE,DVT,INT,CALF,MED, GEN2: Brand: MEDLINE

## (undated) DEVICE — SOLUTION IRRIG 1000ML 0.9% SOD CHL USP POUR PLAS BTL

## (undated) DEVICE — PAD,NON-ADHERENT,3X8,STERILE,LF,1/PK: Brand: MEDLINE

## (undated) DEVICE — ENT-SMH: Brand: MEDLINE INDUSTRIES, INC.

## (undated) DEVICE — ELECTRO LUBE IS A SINGLE PATIENT USE DEVICE THAT IS INTENDED TO BE USED ON ELECTROSURGICAL ELECTRODES TO REDUCE STICKING.: Brand: KEY SURGICAL ELECTRO LUBE

## (undated) DEVICE — NEEDLE HYPO 25GA L1.5IN BLU POLYPR HUB S STL REG BVL STR

## (undated) DEVICE — BASIN ST MAJOR-NO CAUTERY: Brand: MEDLINE INDUSTRIES, INC.

## (undated) DEVICE — HANDLE LT SNAP ON ULT DURABLE LENS FOR TRUMPF ALC DISPOSABLE